# Patient Record
Sex: FEMALE | Race: WHITE | NOT HISPANIC OR LATINO | Employment: UNEMPLOYED | URBAN - METROPOLITAN AREA
[De-identification: names, ages, dates, MRNs, and addresses within clinical notes are randomized per-mention and may not be internally consistent; named-entity substitution may affect disease eponyms.]

---

## 2019-01-17 ENCOUNTER — HOSPITAL ENCOUNTER (EMERGENCY)
Facility: OTHER | Age: 34
Discharge: HOME | End: 2019-01-17
Attending: EMERGENCY MEDICINE
Payer: MEDICAID

## 2019-01-17 VITALS
HEART RATE: 68 BPM | TEMPERATURE: 98 F | DIASTOLIC BLOOD PRESSURE: 68 MMHG | OXYGEN SATURATION: 98 % | SYSTOLIC BLOOD PRESSURE: 121 MMHG | WEIGHT: 170 LBS | RESPIRATION RATE: 17 BRPM

## 2019-01-17 DIAGNOSIS — T14.8XXA ABRASION: ICD-10-CM

## 2019-01-17 DIAGNOSIS — S80.02XA CONTUSION OF LEFT KNEE, INITIAL ENCOUNTER: Primary | ICD-10-CM
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
## 2019-01-17 DIAGNOSIS — T14.8XXA TRAUMATIC MYALGIA: ICD-10-CM

## 2019-01-17 DIAGNOSIS — R52 PAIN: ICD-10-CM

## 2019-01-17 DIAGNOSIS — S70.12XA CONTUSION OF LEFT THIGH, INITIAL ENCOUNTER: ICD-10-CM

## 2019-01-17 LAB
B-HCG UR QL: NEGATIVE
CTP QC/QA: YES

## 2019-01-17 PROCEDURE — 25000003 PHARM REV CODE 250: Performed by: EMERGENCY MEDICINE

## 2019-01-17 PROCEDURE — 25000003 PHARM REV CODE 250: Performed by: PHYSICIAN ASSISTANT

## 2019-01-17 PROCEDURE — 81025 URINE PREGNANCY TEST: CPT | Performed by: EMERGENCY MEDICINE

## 2019-01-17 PROCEDURE — 99284 EMERGENCY DEPT VISIT MOD MDM: CPT

## 2019-01-17 RX ORDER — NAPROXEN 500 MG/1
500 TABLET ORAL 2 TIMES DAILY WITH MEALS
Qty: 20 TABLET | Refills: 0 | Status: SHIPPED | OUTPATIENT
Start: 2019-01-17

## 2019-01-17 RX ORDER — METHOCARBAMOL 500 MG/1
500 TABLET, FILM COATED ORAL
Status: COMPLETED | OUTPATIENT
Start: 2019-01-17 | End: 2019-01-17

## 2019-01-17 RX ORDER — MUPIROCIN 20 MG/G
OINTMENT TOPICAL DAILY
Status: DISCONTINUED | OUTPATIENT
Start: 2019-01-17 | End: 2019-01-17 | Stop reason: HOSPADM

## 2019-01-17 RX ORDER — MUPIROCIN 20 MG/G
OINTMENT TOPICAL DAILY
Status: DISCONTINUED | OUTPATIENT
Start: 2019-01-18 | End: 2019-01-17

## 2019-01-17 RX ORDER — METHOCARBAMOL 500 MG/1
1000 TABLET, FILM COATED ORAL 3 TIMES DAILY
Qty: 30 TABLET | Refills: 0 | Status: SHIPPED | OUTPATIENT
Start: 2019-01-17 | End: 2019-01-22

## 2019-01-17 RX ADMIN — METHOCARBAMOL TABLETS 500 MG: 500 TABLET, COATED ORAL at 05:01

## 2019-01-17 RX ADMIN — MUPIROCIN: 20 OINTMENT TOPICAL at 05:01
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
## 2020-07-07 ENCOUNTER — OFFICE VISIT - HEALTHEAST (OUTPATIENT)
Dept: FAMILY MEDICINE | Facility: CLINIC | Age: 35
End: 2020-07-07

## 2020-07-07 DIAGNOSIS — H60.391 INFECTIVE OTITIS EXTERNA, RIGHT: ICD-10-CM

## 2020-07-07 DIAGNOSIS — H61.23 BILATERAL IMPACTED CERUMEN: ICD-10-CM

## 2020-07-07 RX ORDER — ACYCLOVIR 400 MG/1
TABLET ORAL
Status: SHIPPED | COMMUNITY
Start: 2020-05-26

## 2020-07-07 RX ORDER — NAPROXEN 500 MG/1
500 TABLET ORAL
Status: SHIPPED | COMMUNITY
Start: 2019-01-17

## 2020-07-18 ENCOUNTER — VIRTUAL VISIT (OUTPATIENT)
Dept: FAMILY MEDICINE | Facility: OTHER | Age: 35
End: 2020-07-18

## 2020-07-20 ENCOUNTER — AMBULATORY - HEALTHEAST (OUTPATIENT)
Dept: FAMILY MEDICINE | Facility: CLINIC | Age: 35
End: 2020-07-20

## 2020-07-20 DIAGNOSIS — Z20.822 SUSPECTED COVID-19 VIRUS INFECTION: ICD-10-CM

## 2020-07-21 NOTE — PROGRESS NOTES
"Date: 2020 14:41:48  Clinician: Rupali Streeter  Clinician NPI: [id number]  Patient: Roselyn Villagomez  Patient : 1985  Patient Address: [place] [address]  Patient Phone: [phone]  Visit Protocol: URI  Patient Summary:  Roselyn is a 34 year old ( : 1985 ) female who initiated a Visit for COVID-19 (Coronavirus) evaluation and screening. When asked the question \"Please sign me up to receive news, health information and promotions. \", Roselyn responded \"No\".    Roselyn states her symptoms started 1-2 days ago.   Her symptoms consist of malaise, a headache, a sore throat, and myalgia.   Symptom details     Sore throat: Roselyn reports having mild throat pain (1-3 on a 10 point pain scale), does not have exudate on her tonsils, and can swallow liquids. She is not sure if the lymph nodes in her neck are enlarged. A rash has not appeared on the skin since the sore throat started.     Headache: She states the headache is mild (1-3 on a 10 point pain scale).      Roselyn denies having wheezing, nausea, teeth pain, ageusia, diarrhea, vomiting, rhinitis, ear pain, chills, anosmia, facial pain or pressure, fever, cough, and nasal congestion. She also denies having recent facial or sinus surgery in the past 60 days and taking antibiotic medication in the past month. She is not experiencing dyspnea.   Precipitating events  Within the past week, Roselyn has not been exposed to someone with strep throat. She has not recently been exposed to someone with influenza. Roselyn has been in close contact with the following high risk individuals: people with asthma, heart disease or diabetes, immunocompromised people, and adults 65 or older.   Pertinent COVID-19 (Coronavirus) information  In the past 14 days, Roselyn has not worked in a congregate living setting.   She does not work or volunteer as healthcare worker or a  and does not work or volunteer in a healthcare facility.   Roselyn also " has not lived in a congregate living setting in the past 14 days. She does not live with a healthcare worker.   Roselyn has not had a close contact with a laboratory-confirmed COVID-19 patient within 14 days of symptom onset.   Pertinent medical history  Roselyn typically gets a yeast infection when she takes antibiotics. She is not sure if she has used fluconazole (Diflucan) to treat previous yeast infections.   Roselyn does not need a return to work/school note.   Weight: 175 lbs   Roselyn does not smoke or use smokeless tobacco.   She denies pregnancy and denies breastfeeding. She has menstruated in the past month.   Additional information as reported by the patient (free text): I have asthma and am currently living with my parents, both of whom are over the age of 65 and my dad has a compromised immune system due to cancer treatment and multiple other chronic medical conditions   Weight: 175 lbs    MEDICATIONS: Immune Support oral, ibuprofen oral, vitamin A-vitamin C-vitamin E oral, Daily Multi-Vitamin oral, ALLERGIES: Sulfa (Sulfonamide Antibiotics)  Clinician Response:  Dear Roselyn,   Your symptoms show that you may have coronavirus (COVID-19). This illness can cause fever, cough and trouble breathing. Many people get a mild case and get better on their own. Some people can get very sick.  What should I do?  We would like to test you for this virus.   1. Please call [phone] to schedule your visit. Explain that you were referred by [facility] to have a COVID-19 test. Be ready to share your [facility] visit ID number.  The following will serve as your written order for this COVID Test, ordered by me, for the indication of suspected COVID [Z20.828]: The test will be ordered in [employer], our electronic health record, after you are scheduled. It will show as ordered and authorized by Anderson Abdul MD.  Order: COVID-19 (Coronavirus) PCR for SYMPTOMATIC testing from [facility].      2. When it's time for your COVID test:  Stay at least 6  "feet away from others. (If someone will drive you to your test, stay in the backseat, as far away from the  as you can.)   Cover your mouth and nose with a mask, tissue or washcloth.  Go straight to the testing site. Don't make any stops on the way there or back.      3.Starting now: Stay home and away from others (self-isolate) until:   You've had no fever---and no medicine that reduces fever---for 3 full days (72 hours). And...   Your other symptoms have gotten better. For example, your cough or breathing has improved. And...   At least 10 days have passed since your symptoms started.       During this time, don't leave the house except for testing or medical care.   Stay in your own room, even for meals. Use your own bathroom if you can.   Stay away from others in your home. No hugging, kissing or shaking hands. No visitors.  Don't go to work, school or anywhere else.    Clean \"high touch\" surfaces often (doorknobs, counters, handles, etc.). Use a household cleaning spray or wipes. You'll find a full list of  on the EPA website: www.epa.gov/pesticide-registration/list-n-disinfectants-use-against-sars-cov-2.   Cover your mouth and nose with a mask, tissue or washcloth to avoid spreading germs.  Wash your hands and face often. Use soap and water.  Caregivers in these groups are at risk for severe illness due to COVID-19:  o People 65 years and older  o People who live in a nursing home or long-term care facility  o People with chronic disease (lung, heart, cancer, diabetes, kidney, liver, immunologic)  o People who have a weakened immune system, including those who:   Are in cancer treatment  Take medicine that weakens the immune system, such as corticosteroids  Had a bone marrow or organ transplant  Have an immune deficiency  Have poorly controlled HIV or AIDS  Are obese (body mass index of 40 or higher)  Smoke regularly   o Caregivers should wear gloves while washing dishes, handling laundry and " cleaning bedrooms and bathrooms.  o Use caution when washing and drying laundry: Don't shake dirty laundry, and use the warmest water setting that you can.  o For more tips, go to www.cdc.gov/coronavirus/2019-ncov/downloads/10Things.pdf.    4.Sign up for [employer]. We know it's scary to hear that you might have COVID-19. We want to track your symptoms to make sure you're okay over the next 2 weeks. Please look for an email from [employer]---this is a free, online program that we'll use to keep in touch. To sign up, follow the link in the email. Learn more at [email or web address]  How can I take care of myself?   Get lots of rest. Drink extra fluids (unless a doctor has told you not to).   Take Tylenol (acetaminophen) for fever or pain. If you have liver or kidney problems, ask your family doctor if it's okay to take Tylenol.   Adults can take either:    650 mg (two 325 mg pills) every 4 to 6 hours, or...   1,000 mg (two 500 mg pills) every 8 hours as needed.    Note: Don't take more than 3,000 mg in one day. Acetaminophen is found in many medicines (both prescribed and over-the-counter medicines). Read all labels to be sure you don't take too much.   For children, check the Tylenol bottle for the right dose. The dose is based on the child's age or weight.    If you have other health problems (like cancer, heart failure, an organ transplant or severe kidney disease): Call your specialty clinic if you don't feel better in the next 2 days.       Know when to call 911. Emergency warning signs include:    Trouble breathing or shortness of breath Pain or pressure in the chest that doesn't go away Feeling confused like you haven't felt before, or not being able to wake up Bluish-colored lips or face.  Where can I get more information?    [employer] -- About COVID-19: [email or web address]   CDC -- What to Do If You're Sick: www.cdc.gov/coronavirus/2019-ncov/about/steps-when-sick.html    CDC -- Ending Home Isolation: www.cdc.gov/coronavirus/2019-ncov/hcp/disposition-in-home-patients.html   CDC -- Caring for Someone: www.cdc.gov/coronavirus/2019-ncov/if-you-are-sick/care-for-someone.html   [facility] -- Interim Guidance for Hospital Discharge to Home: www.health.[facility].[place]/diseases/coronavirus/hcp/hospdischarge.pdf   [facility] clinical trials (COVID-19 research studies): clinicalaffairs.[facility]-clinical-trials    Below are the COVID-19 hotlines at the [place] Department of Health ([facility]). Interpreters are available.    For health questions: Call 847-238-3638 or 1-858.112.5355 (7 a.m. to 7 p.m.) For questions about schools and childcare: Call 787-192-6045 or 1-308.373.3222 (7 a.m. to 7 p.m.)    COVID-19 (Coronavirus) General Information  Because there is currently no vaccine to prevent infection, the best way to protect yourself is to avoid being exposed to this virus. Common symptoms of COVID-19 include but are not limited to fever, cough, and shortness of breath. These symptoms appear 2-14 days after you are exposed to the virus that causes COVID-19. Click here for more information from the CDC on how to protect yourself.  If you are sick with COVID-19 or suspect you are infected with the virus that causes COVID-19, follow the steps here from the CDC to help prevent the disease from spreading to people in your home and community.  Click here for general information from the CDC on testing.  If you develop any of these emergency warning signs for COVID-19, get medical attention immediately:     Trouble breathing    Persistent pain or pressure in the chest    New confusion or inability to arouse    Bluish lips or face      Call your doctor or clinic before going in. Call [phone] if you have a medical emergency and notify the  you have or think you may have COVID-19.  For more detailed and up to date information on COVID-19 (Coronavirus), please visit the CDC website.   Diagnosis:  Myalgia  Diagnosis ICD: M79.1

## 2021-06-04 VITALS
TEMPERATURE: 98.4 F | DIASTOLIC BLOOD PRESSURE: 69 MMHG | HEART RATE: 63 BPM | SYSTOLIC BLOOD PRESSURE: 102 MMHG | OXYGEN SATURATION: 96 % | RESPIRATION RATE: 12 BRPM | WEIGHT: 174.2 LBS

## 2021-06-09 NOTE — PATIENT INSTRUCTIONS - HE
You were seen today for an infection of the outer ear, also called otitis externa.    Treatment:  - Use antibiotic drops as prescribed until completion, even if symptoms improve  - May use tylenol or ibuprofen for pain and discomfort  - Should notice symptom improvement in the next 72 hours  - Avoid swimming or getting fluid in the infected ear until 24 hours after symptoms resolve    When to return for re-evaluation?  - If symptoms have not begun improving in 72 hours  - Develop a fever of 100.4F or current fever worsens  - Becomes short of breath  - Neck stiffness  - Difficulty swallowing   - Worsening ear pain despite treatment  - Spreading redness, swelling, and tenderness      You were seen today for ear wax (also known as cerumen) impaction of the ear(s).    Symptom management:  - If symptoms reoccur, may try over-the-counter mineral oil ear drops  - While showering, use your hand to cup the water and gently pour into the ear for a few seconds before allowing to drain    If no improvement after trial of ear drops, follow-up with your primary care provider.    Cerumen Impaction  The structures of the external ear canal secrete a waxy substance known as cerumen. Excess cerumen can build up in the ear canal, causing a condition known as cerumen impaction. Cerumen impaction can cause ear pain and disrupt the function of the ear.  The rate of cerumen production differs for each individual. In certain individuals, the configuration of the ear canal may decrease his or her ability to naturally remove cerumen.  CAUSES  Cerumen impaction is caused by excessive cerumen production or buildup.  RISK FACTORS    Frequent use of swabs to clean ears.    Having narrow ear canals.    Having eczema.    Being dehydrated.  SIGNS AND SYMPTOMS    Diminished hearing.    Ear drainage.    Ear pain.    Ear itch.  TREATMENT  Treatment may involve:    Over-the-counter or prescription ear drops to soften the cerumen.    Removal of cerumen  by a health care provider. This may be done with:    Irrigation with warm water. This is the most common method of removal.    Ear curettes and other instruments.    Surgery. This may be done in severe cases.  HOME CARE INSTRUCTIONS    Take medicines only as directed by your health care provider.    Do not insert objects into the ear with the intent of cleaning the ear.  PREVENTION    Do not insert objects into the ear, even with the intent of cleaning the ear. Removing cerumen as a part of normal hygiene is not necessary, and the use of swabs in the ear canal is not recommended.    Drink enough water to keep your urine clear or pale yellow.    Control your eczema if you have it.  SEEK MEDICAL CARE IF:    You develop ear pain.    You develop bleeding from the ear.    The cerumen does not clear after you use ear drops as directed.     This information is not intended to replace advice given to you by your health care provider. Make sure you discuss any questions you have with your health care provider.     Document Released: 01/25/2006 Document Revised: 10/06/2015 Document Reviewed: 03/17/2011  [facility]  Patient Information  2015 [employer] [facility].

## 2021-06-09 NOTE — PROGRESS NOTES
Assessment & Plan:       1. Infective otitis externa, right     2. Bilateral impacted cerumen  Nursing communication    docusate sodium 50 mg/5 mL oral liquid 50 mg (COLACE)    neomycin-polymyxin-hydrocortisone (CORTISPORIN) otic solution      Medical Decision Making  Patient presents with right ear discomfort gradually worsening over the last week.  She was found to have cerumen impaction that was manually removed by the clinical assistant with warm water and Colace.  After cerumen was removed patient was found to have a mild otitis externa of the right ear.  Otherwise no signs of otitis media.  Recommend patient continue oral Zyrtec as needed for her allergies.  Discussed avoidance of water exposure.  Discussed ongoing ear care to prevent cerumen buildup.  Discussed signs of worsening symptoms and when to follow-up with PCP if no symptom improvement.    Patient Instructions   You were seen today for an infection of the outer ear, also called otitis externa.    Treatment:  - Use antibiotic drops as prescribed until completion, even if symptoms improve  - May use tylenol or ibuprofen for pain and discomfort  - Should notice symptom improvement in the next 72 hours  - Avoid swimming or getting fluid in the infected ear until 24 hours after symptoms resolve    When to return for re-evaluation?  - If symptoms have not begun improving in 72 hours  - Develop a fever of 100.4F or current fever worsens  - Becomes short of breath  - Neck stiffness  - Difficulty swallowing   - Worsening ear pain despite treatment  - Spreading redness, swelling, and tenderness      You were seen today for ear wax (also known as cerumen) impaction of the ear(s).    Symptom management:  - If symptoms reoccur, may try over-the-counter mineral oil ear drops  - While showering, use your hand to cup the water and gently pour into the ear for a few seconds before allowing to drain    If no improvement after trial of ear drops, follow-up with your  primary care provider.    Cerumen Impaction  The structures of the external ear canal secrete a waxy substance known as cerumen. Excess cerumen can build up in the ear canal, causing a condition known as cerumen impaction. Cerumen impaction can cause ear pain and disrupt the function of the ear.  The rate of cerumen production differs for each individual. In certain individuals, the configuration of the ear canal may decrease his or her ability to naturally remove cerumen.  CAUSES  Cerumen impaction is caused by excessive cerumen production or buildup.  RISK FACTORS    Frequent use of swabs to clean ears.    Having narrow ear canals.    Having eczema.    Being dehydrated.  SIGNS AND SYMPTOMS    Diminished hearing.    Ear drainage.    Ear pain.    Ear itch.  TREATMENT  Treatment may involve:    Over-the-counter or prescription ear drops to soften the cerumen.    Removal of cerumen by a health care provider. This may be done with:    Irrigation with warm water. This is the most common method of removal.    Ear curettes and other instruments.    Surgery. This may be done in severe cases.  HOME CARE INSTRUCTIONS    Take medicines only as directed by your health care provider.    Do not insert objects into the ear with the intent of cleaning the ear.  PREVENTION    Do not insert objects into the ear, even with the intent of cleaning the ear. Removing cerumen as a part of normal hygiene is not necessary, and the use of swabs in the ear canal is not recommended.    Drink enough water to keep your urine clear or pale yellow.    Control your eczema if you have it.  SEEK MEDICAL CARE IF:    You develop ear pain.    You develop bleeding from the ear.    The cerumen does not clear after you use ear drops as directed.     This information is not intended to replace advice given to you by your health care provider. Make sure you discuss any questions you have with your health care provider.     Document Released: 01/25/2006  Document Revised: 10/06/2015 Document Reviewed: 03/17/2011  ExitCare  Patient Information  2015 [employer].            Subjective:       Jenny Youssef is a 34 y.o. female here for evaluation of ear pressure discomfort in the right ear.  Onset of symptoms was 1 week ago with gradual worsening over the last 2 days.  Patient initially felt like her right ear is plugged.  She now notes having a sensation of fluid in the ear and pain in the right ear.  She otherwise denies recent swimming exposure.  No fevers, cough, sore throat, or sinus congestion.  Patient states that she does have a history of mild seasonal allergies.  She takes daily Zyrtec with good relief.  Patient did try over-the-counter hydrogen peroxide eardrops with no improvement.    The following portions of the patient's history were reviewed and updated as appropriate: allergies, current medications and problem list.    Review of Systems  Pertinent items are noted in HPI.     Allergies  Allergies   Allergen Reactions     Sulfa (Sulfonamide Antibiotics)      hives       No family history on file.    Social History     Socioeconomic History     Marital status: Single     Spouse name: None     Number of children: None     Years of education: None     Highest education level: None   Occupational History     None   Social Needs     Financial resource strain: None     Food insecurity     Worry: None     Inability: None     Transportation needs     Medical: None     Non-medical: None   Tobacco Use     Smoking status: Never Smoker     Smokeless tobacco: Never Used   Substance and Sexual Activity     Alcohol use: None     Drug use: None     Sexual activity: None   Lifestyle     Physical activity     Days per week: None     Minutes per session: None     Stress: None   Relationships     Social connections     Talks on phone: None     Gets together: None     Attends Judaism service: None     Active member of club or organization: None     Attends meetings of  clubs or organizations: None     Relationship status: None     Intimate partner violence     Fear of current or ex partner: None     Emotionally abused: None     Physically abused: None     Forced sexual activity: None   Other Topics Concern     None   Social History Narrative     None         Objective:       /69   Pulse 63   Temp 98.4  F (36.9  C) (Oral)   Resp 12   Wt 174 lb 3.2 oz (79 kg)   LMP 07/01/2020   SpO2 96%   Breastfeeding No   General appearance: alert, appears stated age, cooperative, no distress and non-toxic  Ears: Unable to visualize TMs due to cerumen impaction bilaterally, tenderness to tragus palpation of the right ear only.  After cerumen was removed: Left TM is intact with mild mucoid fluid and bulging, no erythema; right: TM is intact with mild mucoid fluid and bulging, no erythema, external ear canal does appear mildly erythematous with mild swelling

## 2024-10-25 ENCOUNTER — TELEPHONE (OUTPATIENT)
Dept: PLASTIC SURGERY | Facility: CLINIC | Age: 39
End: 2024-10-25

## 2024-10-25 NOTE — CONFIDENTIAL NOTE
Writer [facility] re: pt request for a top surgery consult with Dr. Ramírez. Relayed that she's not scheduling new pts due to being more than a year out, but pt can call back to get on her consult waitlist or receive a list of other surgeons in the area.

## 2024-10-25 NOTE — TELEPHONE ENCOUNTER
[facility] Call Center    Phone Message    May a detailed message be left on voicemail: yes     Reason for Call: Other: Online request - Top Surgery consult with Dr. Ramírez     Action Taken: Message routed to:  [facility]    Travel Screening: N/A   Date of Service:

## 2025-06-25 PROCEDURE — 88305 TISSUE EXAM BY PATHOLOGIST: CPT | Mod: 26 | Performed by: PATHOLOGY

## 2025-06-25 PROCEDURE — 88305 TISSUE EXAM BY PATHOLOGIST: CPT | Mod: TC,ORL | Performed by: PLASTIC SURGERY

## 2025-06-26 ENCOUNTER — LAB REQUISITION (OUTPATIENT)
Dept: LAB | Facility: CLINIC | Age: 40
End: 2025-06-26
Payer: COMMERCIAL

## 2025-06-27 LAB
PATH REPORT.COMMENTS IMP SPEC: NORMAL
PATH REPORT.COMMENTS IMP SPEC: NORMAL
PATH REPORT.FINAL DX SPEC: NORMAL
PATH REPORT.GROSS SPEC: NORMAL
PATH REPORT.MICROSCOPIC SPEC OTHER STN: NORMAL
PATH REPORT.RELEVANT HX SPEC: NORMAL
PHOTO IMAGE: NORMAL